# Patient Record
Sex: FEMALE | Employment: UNEMPLOYED | URBAN - METROPOLITAN AREA
[De-identification: names, ages, dates, MRNs, and addresses within clinical notes are randomized per-mention and may not be internally consistent; named-entity substitution may affect disease eponyms.]

---

## 2018-01-11 NOTE — MISCELLANEOUS
History of Present Illness  A call was made to the patient/patient's family  [facility] Additional Notes: [facility] was able to talk to the patient today  She has f/u OV with Dr Yasmani Small on 8/5  She stated she does not use salt but hasn't been weighing herself because she needs a battery for her scale  She is complaining of having difficulty sleeping at night because she has been used to using CPAP  Future Appointments    Date/Time Provider Specialty Site   09/15/2016 02:15 PM CINDI Perez  Pulmonary Medicine [facility] [place]   08/05/2016 08:00 AM CINDI Kaiser   Cardiology  CARDIOLOGY  [place]     Signatures   Electronically signed by : Vamshi Lanier, ; Jul 22 2016  1:14PM EST                       (Author)

## 2018-01-12 NOTE — MISCELLANEOUS
Message  Called patient and left messages to schedule a hospital follow up, patient has not return my call        Signatures   Electronically signed by : CINDI Caldwell ; Jul 13 2016 12:29PM EST

## 2018-01-12 NOTE — MISCELLANEOUS
Message  Called patient x3 to schedule post hospital apt  with Dr Freda Hyde   Electronically signed by : Eli Pineda, ; Aug 11 2016  2:53PM EST                       (Author)

## 2018-01-16 NOTE — MISCELLANEOUS
History of Present Illness    The patient is being contacted for follow-up after hospitalization  Left message on voicemail requesting return call  This was not a readmission  The date of discharge was 7/9/16  She has a high risk for readmission            Signatures   Electronically signed by : Lyndsay Jacobs, ; Jul 12 2016  3:07PM EST                       (Author)

## 2018-01-18 NOTE — MISCELLANEOUS
History of Present Illness  Communication  A message was left on voicemail requesting a return phone call  A call was made to the patient/patient's family  The contact name and phone number is   Patient had an OV with Dr Lazarus Pale scheduled for today and the HF program has a scale to give her but she did not show for her appointment  Call to patient but no answer, LM on  requesting return call to HF Program and encouraged her to call [facility] office and reschedule  Future Appointments    Date/Time Provider Specialty Site   09/15/2016 02:15 PM CINDI German   Pulmonary Medicine [facility] [place]     Signatures   Electronically signed by : Nanette Gallegos, ; Aug  5 2016  1:43PM EST                       (Author)

## 2018-08-18 ENCOUNTER — HOSPITAL ENCOUNTER (INPATIENT)
Age: 53
LOS: 1 days | Discharge: HOME | DRG: 293 | End: 2018-08-20
Attending: EMERGENCY MEDICINE | Admitting: HOSPITALIST

## 2018-08-18 ENCOUNTER — APPOINTMENT (OUTPATIENT)
Dept: GENERAL RADIOLOGY | Age: 53
DRG: 293 | End: 2018-08-18
Attending: EMERGENCY MEDICINE

## 2018-08-18 DIAGNOSIS — I50.9 ACUTE ON CHRONIC HEART FAILURE, UNSPECIFIED HEART FAILURE TYPE (HCC): Primary | ICD-10-CM

## 2018-08-18 DIAGNOSIS — F10.920 ALCOHOLIC INTOXICATION WITHOUT COMPLICATION (HCC): ICD-10-CM

## 2018-08-18 LAB
ANION GAP SERPL CALC-SCNC: 10 MMOL/L (ref 3–18)
BASOPHILS # BLD: 0 K/UL (ref 0–0.1)
BASOPHILS NFR BLD: 0 % (ref 0–2)
BNP SERPL-MCNC: 8545 PG/ML (ref 0–900)
BUN SERPL-MCNC: 21 MG/DL (ref 7–18)
BUN/CREAT SERPL: 22 (ref 12–20)
CALCIUM SERPL-MCNC: 9.3 MG/DL (ref 8.5–10.1)
CHLORIDE SERPL-SCNC: 104 MMOL/L (ref 100–108)
CO2 SERPL-SCNC: 29 MMOL/L (ref 21–32)
CREAT SERPL-MCNC: 0.96 MG/DL (ref 0.6–1.3)
DIFFERENTIAL METHOD BLD: ABNORMAL
EOSINOPHIL # BLD: 0.1 K/UL (ref 0–0.4)
EOSINOPHIL NFR BLD: 1 % (ref 0–5)
ERYTHROCYTE [DISTWIDTH] IN BLOOD BY AUTOMATED COUNT: 22.2 % (ref 11.6–14.5)
GLUCOSE SERPL-MCNC: 122 MG/DL (ref 74–99)
HCT VFR BLD AUTO: 44.5 % (ref 35–45)
HGB BLD-MCNC: 14 G/DL (ref 12–16)
LYMPHOCYTES # BLD: 3.6 K/UL (ref 0.9–3.6)
LYMPHOCYTES NFR BLD: 35 % (ref 21–52)
MAGNESIUM SERPL-MCNC: 2.1 MG/DL (ref 1.6–2.6)
MCH RBC QN AUTO: 30.4 PG (ref 24–34)
MCHC RBC AUTO-ENTMCNC: 31.5 G/DL (ref 31–37)
MCV RBC AUTO: 96.7 FL (ref 74–97)
MONOCYTES # BLD: 1.3 K/UL (ref 0.05–1.2)
MONOCYTES NFR BLD: 13 % (ref 3–10)
NEUTS SEG # BLD: 5.3 K/UL (ref 1.8–8)
NEUTS SEG NFR BLD: 51 % (ref 40–73)
PLATELET # BLD AUTO: 257 K/UL (ref 135–420)
PMV BLD AUTO: 10.4 FL (ref 9.2–11.8)
POTASSIUM SERPL-SCNC: 3.8 MMOL/L (ref 3.5–5.5)
RBC # BLD AUTO: 4.6 M/UL (ref 4.2–5.3)
SODIUM SERPL-SCNC: 143 MMOL/L (ref 136–145)
TROPONIN I SERPL-MCNC: 0.03 NG/ML (ref 0–0.04)
WBC # BLD AUTO: 10.2 K/UL (ref 4.6–13.2)

## 2018-08-18 PROCEDURE — 83880 ASSAY OF NATRIURETIC PEPTIDE: CPT | Performed by: EMERGENCY MEDICINE

## 2018-08-18 PROCEDURE — 96374 THER/PROPH/DIAG INJ IV PUSH: CPT

## 2018-08-18 PROCEDURE — 77030029684 HC NEB SM VOL KT MONA -A

## 2018-08-18 PROCEDURE — 83735 ASSAY OF MAGNESIUM: CPT | Performed by: EMERGENCY MEDICINE

## 2018-08-18 PROCEDURE — 80048 BASIC METABOLIC PNL TOTAL CA: CPT | Performed by: EMERGENCY MEDICINE

## 2018-08-18 PROCEDURE — 96375 TX/PRO/DX INJ NEW DRUG ADDON: CPT

## 2018-08-18 PROCEDURE — 74011000250 HC RX REV CODE- 250: Performed by: EMERGENCY MEDICINE

## 2018-08-18 PROCEDURE — 74011250636 HC RX REV CODE- 250/636: Performed by: EMERGENCY MEDICINE

## 2018-08-18 PROCEDURE — 99285 EMERGENCY DEPT VISIT HI MDM: CPT

## 2018-08-18 PROCEDURE — 71045 X-RAY EXAM CHEST 1 VIEW: CPT

## 2018-08-18 PROCEDURE — 96361 HYDRATE IV INFUSION ADD-ON: CPT

## 2018-08-18 PROCEDURE — 94640 AIRWAY INHALATION TREATMENT: CPT

## 2018-08-18 PROCEDURE — 84484 ASSAY OF TROPONIN QUANT: CPT | Performed by: EMERGENCY MEDICINE

## 2018-08-18 PROCEDURE — 85025 COMPLETE CBC W/AUTO DIFF WBC: CPT | Performed by: EMERGENCY MEDICINE

## 2018-08-18 PROCEDURE — 93005 ELECTROCARDIOGRAM TRACING: CPT

## 2018-08-18 RX ORDER — WARFARIN 1 MG/1
1 TABLET ORAL DAILY
COMMUNITY

## 2018-08-18 RX ORDER — FUROSEMIDE 40 MG/1
40 TABLET ORAL 2 TIMES DAILY
COMMUNITY

## 2018-08-18 RX ORDER — METOPROLOL SUCCINATE 50 MG/1
50 TABLET, EXTENDED RELEASE ORAL DAILY
COMMUNITY

## 2018-08-18 RX ORDER — FLUTICASONE FUROATE AND VILANTEROL 200; 25 UG/1; UG/1
1 POWDER RESPIRATORY (INHALATION) DAILY
COMMUNITY

## 2018-08-18 RX ORDER — PREDNISONE 10 MG/1
10 TABLET ORAL SEE ADMIN INSTRUCTIONS
COMMUNITY
End: 2018-08-20

## 2018-08-18 RX ORDER — POTASSIUM CHLORIDE 750 MG/1
10 TABLET, EXTENDED RELEASE ORAL DAILY
COMMUNITY

## 2018-08-18 RX ORDER — FUROSEMIDE 10 MG/ML
40 INJECTION INTRAMUSCULAR; INTRAVENOUS
Status: COMPLETED | OUTPATIENT
Start: 2018-08-18 | End: 2018-08-18

## 2018-08-18 RX ORDER — FAMOTIDINE 20 MG/1
20 TABLET, FILM COATED ORAL 2 TIMES DAILY
COMMUNITY

## 2018-08-18 RX ORDER — METFORMIN HYDROCHLORIDE 500 MG/1
500 TABLET ORAL 2 TIMES DAILY WITH MEALS
COMMUNITY

## 2018-08-18 RX ORDER — ATORVASTATIN CALCIUM 20 MG/1
20 TABLET, FILM COATED ORAL
COMMUNITY

## 2018-08-18 RX ORDER — IPRATROPIUM BROMIDE AND ALBUTEROL SULFATE 2.5; .5 MG/3ML; MG/3ML
3 SOLUTION RESPIRATORY (INHALATION)
Status: COMPLETED | OUTPATIENT
Start: 2018-08-18 | End: 2018-08-18

## 2018-08-18 RX ORDER — ALBUTEROL SULFATE 0.83 MG/ML
5 SOLUTION RESPIRATORY (INHALATION)
Status: COMPLETED | OUTPATIENT
Start: 2018-08-18 | End: 2018-08-18

## 2018-08-18 RX ORDER — ALBUTEROL SULFATE 90 UG/1
2 AEROSOL, METERED RESPIRATORY (INHALATION)
COMMUNITY

## 2018-08-18 RX ORDER — LISINOPRIL 5 MG/1
5 TABLET ORAL DAILY
COMMUNITY

## 2018-08-18 RX ORDER — ASPIRIN 81 MG/1
81 TABLET ORAL DAILY
COMMUNITY

## 2018-08-18 RX ORDER — ALLOPURINOL 100 MG/1
100 TABLET ORAL DAILY
COMMUNITY

## 2018-08-18 RX ADMIN — ALBUTEROL SULFATE 5 MG: 2.5 SOLUTION RESPIRATORY (INHALATION) at 19:06

## 2018-08-18 RX ADMIN — METHYLPREDNISOLONE SODIUM SUCCINATE 125 MG: 125 INJECTION, POWDER, FOR SOLUTION INTRAMUSCULAR; INTRAVENOUS at 19:03

## 2018-08-18 RX ADMIN — FUROSEMIDE 40 MG: 10 INJECTION, SOLUTION INTRAMUSCULAR; INTRAVENOUS at 20:51

## 2018-08-18 RX ADMIN — SODIUM CHLORIDE 1000 ML: 900 INJECTION, SOLUTION INTRAVENOUS at 19:01

## 2018-08-18 RX ADMIN — IPRATROPIUM BROMIDE AND ALBUTEROL SULFATE 3 ML: .5; 3 SOLUTION RESPIRATORY (INHALATION) at 19:06

## 2018-08-18 NOTE — ED TRIAGE NOTES
Patient presents via EMS for c/o chronic gout pain that is \"so bad that it is making her short of breath\". Patient able to speak in full sentences and able to move from EMS stretcher to ED stretcher NAD.

## 2018-08-18 NOTE — ED PROVIDER NOTES
EMERGENCY DEPARTMENT HISTORY AND PHYSICAL EXAM    6:07 PM      Date: 8/18/2018  Patient Name: Tati Bolanos    History of Presenting Illness     Chief Complaint   Patient presents with    Pain (Chronic)    Shortness of Breath         History Provided By: Patient    Chief Complaint: Bilateral knee pain  Duration:  Months  Timing:  Constant  Location: Bilateral knees  Quality:   Severity: Moderate  Modifying Factors: Movement  Associated Symptoms: Intermittent body aches and SOB      Additional History (Context): Tati Bolanos is a 48 y.o. female with a hx of COPD, CHF, and cirrhosis who presents with complaints of bilateral knee pain that she states she has had for a long time. She reports that the pain sometimes migrates into other areas of her body. She was recently seen at [facility] two days ago and was told that her sx were due to gout. She also reports some SOB. She has a hx of COPD and is not on O2 at baseline. She admits to drinking occasionally, she last drank earlier today. She was drinking an 8% alcohol beer cooler and was confused why here friends were drunk but she was not reportedly intoxicated. She states that she doesn't drink every day, but [place] She smokes but states she is cutting down. She has smoked for the past 38 years. Prior hx of crack use, but she has been clean since 1998. She denies fall, trauma, HA, abdominal pain, chest pain, fever, and any further complaints. PCP: None        Past History     Past Medical History:  Past Medical History:   Diagnosis Date    CHF [facility]congestive heart failure) ([facility] [address]     Cirrhosis (HCC)     COPD (chronic obstructive pulmonary disease) (HCC)     Gout        Past Surgical History:  No past surgical history on file. Family History:  No family history on file.     Social History:  Social History   Substance Use Topics    Smoking status: Not on file    Smokeless tobacco: Not on file  Alcohol use Not on file       Allergies:  No Known Allergies      Review of Systems     Review of Systems   Constitutional: Negative for fever. Respiratory: Positive for shortness of breath. Gastrointestinal: Negative for abdominal pain, diarrhea, nausea and vomiting. Musculoskeletal: Positive for arthralgias (bilateral knees) and myalgias. Neurological: Negative for numbness and headaches. All other systems reviewed and are negative. Visit Vitals    /56    Pulse (!) 115    Temp 98 °F (36.7 °C)    Resp (!) 33    Ht 5' 5\" (1.651 m)    Wt 88.5 kg (195 lb)    SpO2 97%    BMI 32.45 kg/m2           Physical Exam / Medical Decision Making   I am the first provider for this patient. I reviewed the vital signs, available nursing notes, past medical history, past surgical history, family history and social history. Vital Signs-Reviewed the patient's vital signs. Physical exam:  General:  Well-developed, well-nourished, mildly slurred speech. Head:  Normocephalic atraumatic. Eyes:  Pupils midrange extraocular movements intact. No pallor or conjunctival injection. Nose:  No rhinorrhea, inspection grossly normal.    Ears:  Grossly normal to inspection, no discharge. Mouth:  Mucous membranes dry , no appreciable intraoral lesion. Neck/Back:  Trachea midline, no asymmetry. Chest:  Grossly normal inspection, symmetric chest rise. Pulmonary:  Clear to auscultation bilaterally no wheezes rhonchi or rales. Cardiovascular:  S1-S2 no murmurs rubs or gallops. Abdomen: Soft, nontender, minimally distended no guarding rebound or peritoneal signs. Extremities:  Grossly normal to inspection, peripheral pulses intact     Neurologic:  Alert and oriented no appreciable focal neurologic deficit    Skin:  Warm and dry  Psychiatric:  Grossly normal mood and affect  Nursing note reviewed, vital signs reviewed.     ED course:  Patient presents with initial complaint of pain all over, no recent trauma pain is in the RIGHT leg and RIGHT arm the RIGHT trapezius, very sleepy and reports that she has been [place] the beach all day. She has a history of CAD smokes cigarettes and drinks alcohol. 2nd complaint is of her shortness of breath, history of COPD and chronically since PCP exacerbation she is tachycardic but dehydrated reports drinking on the beach all day and clinically intoxicated. She is tachycardic, clinical history suggests that she is dehydrated as she was sitting on a beach drinking wine coolers all day. We'll bolus here and reevaluate does have a history of CHF by her report \"the LEFT side of her heart is okay\"    We'll hydrate and monitor    Monitor sinus tachycardia    EKG done at 1942 hrs. sinus tachycardia heart rate 116 intervals within normal limits no ST changes, seems to have a long QTc visually computer interpretation is 480. Chest x-ray per my interpretation cardiomegaly and pulmonary edema    BNP elevated consistent with CHF    Patient reevaluated her wheezing has cleared, her lung sounds are clear however she is still tachypneic her tachycardia is improved, will diurese here and reevaluate    She just moved here has no local cardiologist.  Merly  reassess after diuresis    Patient was diuresed, multiple episodes of urine, remains tachycardic and tachypneic    We'll admit for further management      Patient's presentation, history, physical exam and laboratory evaluations were reviewed. I felt the patient would benefit from inpatient management and treatment. Consult:  Discussed care with Dr. Dillon Russo. Standard discussion; including history of patients chief complaint, available diagnostic results, and treatment course. Patient was accepted to their service.           Disposition:    Admitted to medicine service      Portions of this chart were created with Dragon medical speech to text program.   Unrecognized errors may be present. Diagnostic Study Results     Labs -  Recent Results (from the past 12 hour(s))   CBC WITH AUTOMATED DIFF    Collection Time: 08/18/18  5:50 PM   Result Value Ref Range    WBC 10.2 4.6 - 13.2 K/uL    RBC 4.60 4.20 - 5.30 M/uL    HGB 14.0 12.0 - 16.0 g/dL    HCT 44.5 35.0 - 45.0 %    MCV 96.7 74.0 - 97.0 FL    MCH 30.4 24.0 - 34.0 PG    MCHC 31.5 31.0 - 37.0 g/dL    RDW 22.2 (H) 11.6 - 14.5 %    PLATELET 981 114 - 482 K/uL    MPV 10.4 9.2 - 11.8 FL    NEUTROPHILS 51 40 - 73 %    LYMPHOCYTES 35 21 - 52 %    MONOCYTES 13 (H) 3 - 10 %    EOSINOPHILS 1 0 - 5 %    BASOPHILS 0 0 - 2 %    ABS. NEUTROPHILS 5.3 1.8 - 8.0 K/UL    ABS. LYMPHOCYTES 3.6 0.9 - 3.6 K/UL    ABS. MONOCYTES 1.3 (H) 0.05 - 1.2 K/UL    ABS. EOSINOPHILS 0.1 0.0 - 0.4 K/UL    ABS.  BASOPHILS 0.0 0.0 - 0.1 K/UL    DF AUTOMATED     METABOLIC PANEL, BASIC    Collection Time: 08/18/18  5:50 PM   Result Value Ref Range    Sodium 143 136 - 145 mmol/L    Potassium 3.8 3.5 - 5.5 mmol/L    Chloride 104 100 - 108 mmol/L    CO2 29 21 - 32 mmol/L    Anion gap 10 3.0 - 18 mmol/L    Glucose 122 (H) 74 - 99 mg/dL    BUN 21 (H) 7.0 - 18 MG/DL    Creatinine 0.96 0.6 - 1.3 MG/DL    BUN/Creatinine ratio 22 (H) 12 - 20      GFR est AA >60 >60 ml/min/1.73m2    GFR est non-AA >60 >60 ml/min/1.73m2    Calcium 9.3 8.5 - 10.1 MG/DL   MAGNESIUM    Collection Time: 08/18/18  5:50 PM   Result Value Ref Range    Magnesium 2.1 1.6 - 2.6 mg/dL   TROPONIN I    Collection Time: 08/18/18  5:50 PM   Result Value Ref Range    Troponin-I, Qt. 0.03 0.0 - 0.045 NG/ML   NT-PRO BNP    Collection Time: 08/18/18  5:50 PM   Result Value Ref Range    NT pro-BNP 8545 (H) 0 - 900 PG/ML   EKG, 12 LEAD, INITIAL    Collection Time: 08/18/18  7:42 PM   Result Value Ref Range    Ventricular Rate 116 BPM    Atrial Rate 116 BPM    P-R Interval 150 ms    QRS Duration 72 ms    Q-T Interval 346 ms    QTC Calculation (Bezet) 480 ms    Calculated P Axis 64 degrees    Calculated R Axis 108 degrees Calculated T Axis 38 degrees    Diagnosis       Sinus tachycardia  Biatrial enlargement  Rightward axis  Pulmonary disease pattern  Abnormal ECG  No previous ECGs available         Radiologic Studies -   XR CHEST PORT    (Results Pending)       Diagnosis     Clinical Impression: No diagnosis found. Follow-up Information     None           Current Discharge Medication List      CONTINUE these medications which have NOT CHANGED    Details   famotidine (PEPCID) 20 mg tablet Take 20 mg by mouth two (2) times a day. metoprolol succinate (TOPROL-XL) 50 mg XL tablet Take 50 mg by mouth daily. predniSONE (STERAPRED DS) 10 mg dose pack Take 10 mg by mouth See Admin Instructions. See administration instruction per 10mg dose pack      atorvastatin (LIPITOR) 20 mg tablet Take 20 mg by mouth nightly. lisinopril (PRINIVIL, ZESTRIL) 5 mg tablet Take 5 mg by mouth daily. allopurinol (ZYLOPRIM) 100 mg tablet Take 100 mg by mouth daily. potassium chloride (KLOR-CON M10) 10 mEq tablet Take 10 mEq by mouth daily. furosemide (LASIX) 40 mg tablet Take 40 mg by mouth two (2) times a day. warfarin (COUMADIN) 1 mg tablet Take 1 mg by mouth daily. 2 tablets daily or as directed by clinic      metFORMIN (GLUCOPHAGE) 500 mg tablet Take 500 mg by mouth two (2) times daily (with meals). aspirin delayed-release 81 mg tablet Take 81 mg by mouth daily. umeclidinium (INCRUSE ELLIPTA) 62.5 mcg/actuation inhaler Take 1 Puff by inhalation daily. fluticasone-vilanterol (BREO ELLIPTA) 200-25 mcg/dose inhaler Take 1 Puff by inhalation daily.       albuterol (VENTOLIN HFA) 90 mcg/actuation inhaler Take 2 Puffs by inhalation every four (4) hours as needed for Wheezing.           _______________________________    Attestations:  Randell [address] [place] [address] acting as a scribe for and in the presence of Mendoza Álvarez MD      August 19, 2018 at 1:45 AM       Provider Attestation:      I personally performed the services described in the documentation, reviewed the documentation, as recorded by the scribe in my presence, and it accurately and completely records my words and actions.  August 19, 2018 at 1:45 AM - Mark Osorio MD    _______________________________

## 2018-08-18 NOTE — IP AVS SNAPSHOT
Daniela Bowens 
 
 
 [address] [place] [address] 
[phone] Patient: Cas Mata MRN: [id number] :1965 A check rona indicates which time of day the medication should be taken. My Medications CONTINUE taking these medications Instructions Each Dose to Equal  
 Morning Noon Evening Bedtime  
 allopurinol 100 mg tablet Commonly known as:  Mattie Huh Your last dose was: Your next dose is: Take 100 mg by mouth daily. 100 mg  
    
   
   
   
  
 aspirin delayed-release 81 mg tablet Your last dose was: Your next dose is: Take 81 mg by mouth daily. 81 mg  
    
   
   
   
  
 atorvastatin 20 mg tablet Commonly known as:  LIPITOR Your last dose was: Your next dose is: Take 20 mg by mouth nightly. 20 mg  
    
   
   
   
  
 BREO ELLIPTA 200-25 mcg/dose inhaler Generic drug:  fluticasone-vilanterol Your last dose was: Your next dose is: Take 1 Puff by inhalation daily. 1 Puff  
    
   
   
   
  
 famotidine 20 mg tablet Commonly known as:  PEPCID Your last dose was: Your next dose is: Take 20 mg by mouth two (2) times a day. 20 mg  
    
   
   
   
  
 furosemide 40 mg tablet Commonly known as:  LASIX Your last dose was: Your next dose is: Take 40 mg by mouth two (2) times a day. 40 mg  
    
   
   
   
  
 INCRUSE ELLIPTA 62.5 mcg/actuation inhaler Generic drug:  umeclidinium Your last dose was: Your next dose is: Take 1 Puff by inhalation daily. 1 Puff KLOR-CON M10 10 mEq tablet Generic drug:  potassium chloride Your last dose was: Your next dose is: Take 10 mEq by mouth daily. 10 mEq  
    
   
   
   
  
 lisinopril 5 mg tablet Commonly known as:  Charis Osman  
 Your last dose was: Your next dose is: Take 5 mg by mouth daily. 5 mg  
    
   
   
   
  
 metFORMIN 500 mg tablet Commonly known as:  GLUCOPHAGE Your last dose was: Your next dose is: Take 500 mg by mouth two (2) times daily (with meals). 500 mg  
    
   
   
   
  
 metoprolol succinate 50 mg XL tablet Commonly known as:  TOPROL-XL Your last dose was: Your next dose is: Take 50 mg by mouth daily. 50 mg VENTOLIN HFA 90 mcg/actuation inhaler Generic drug:  albuterol Your last dose was: Your next dose is: Take 2 Puffs by inhalation every four (4) hours as needed for Wheezing. 2 Puff  
    
   
   
   
  
 warfarin 1 mg tablet Commonly known as:  COUMADIN Your last dose was: Your next dose is: Take 1 mg by mouth daily. 2 tablets daily or as directed by clinic  
 1 mg STOP taking these medications   
 predniSONE 10 mg dose pack Commonly known as:  STERAPRED DS

## 2018-08-18 NOTE — IP AVS SNAPSHOT
[address] [address] 
[phone] Patient: Magalie Blum MRN: [id number] :1965 About your hospitalization You were admitted on:  2018 You last received care in the:  [address] [place] [address] You were discharged on:  2018 Why you were hospitalized Your primary diagnosis was:  Acute On Chronic Heart Failure (Hcc) Your diagnoses also included:  Gout, Copd (Chronic Obstructive Pulmonary Disease) (Hcc), Dm (Diabetes Mellitus) (Hcc), Hld (Hyperlipidemia), Acute On Chronic Congestive Heart Failure (Hcc), Aron (Obstructive Sleep Apnea), Alcohol Intoxication (Hcc) Follow-up Information Follow up With Details Comments Contact Info Jorge Luis Nichols NP In 1 week follow up with PCP within 1 week to discuss hospitalization [id number] [id number] 
[phone] Discharge Orders None A check rona indicates which time of day the medication should be taken. My Medications CONTINUE taking these medications Instructions Each Dose to Equal  
 Morning Noon Evening Bedtime  
 allopurinol 100 mg tablet Commonly known as:  Ollen Epley Your last dose was: Your next dose is: Take 100 mg by mouth daily. 100 mg  
    
   
   
   
  
 aspirin delayed-release 81 mg tablet Your last dose was: Your next dose is: Take 81 mg by mouth daily. 81 mg  
    
   
   
   
  
 atorvastatin 20 mg tablet Commonly known as:  LIPITOR Your last dose was: Your next dose is: Take 20 mg by mouth nightly. 20 mg  
    
   
   
   
  
 BREO ELLIPTA 200-25 mcg/dose inhaler Generic drug:  fluticasone-vilanterol Your last dose was: Your next dose is: Take 1 Puff by inhalation daily. 1 Puff  
    
   
   
   
  
 famotidine 20 mg tablet Commonly known as:  PEPCID  
 Your last dose was: Your next dose is: Take 20 mg by mouth two (2) times a day. 20 mg  
    
   
   
   
  
 furosemide 40 mg tablet Commonly known as:  LASIX Your last dose was: Your next dose is: Take 40 mg by mouth two (2) times a day. 40 mg  
    
   
   
   
  
 INCRUSE ELLIPTA 62.5 mcg/actuation inhaler Generic drug:  umeclidinium Your last dose was: Your next dose is: Take 1 Puff by inhalation daily. 1 Puff KLOR-CON M10 10 mEq tablet Generic drug:  potassium chloride Your last dose was: Your next dose is: Take 10 mEq by mouth daily. 10 mEq  
    
   
   
   
  
 lisinopril 5 mg tablet Commonly known as:  Alonso Economy Your last dose was: Your next dose is: Take 5 mg by mouth daily. 5 mg  
    
   
   
   
  
 metFORMIN 500 mg tablet Commonly known as:  GLUCOPHAGE Your last dose was: Your next dose is: Take 500 mg by mouth two (2) times daily (with meals). 500 mg  
    
   
   
   
  
 metoprolol succinate 50 mg XL tablet Commonly known as:  TOPROL-XL Your last dose was: Your next dose is: Take 50 mg by mouth daily. 50 mg VENTOLIN HFA 90 mcg/actuation inhaler Generic drug:  albuterol Your last dose was: Your next dose is: Take 2 Puffs by inhalation every four (4) hours as needed for Wheezing. 2 Puff  
    
   
   
   
  
 warfarin 1 mg tablet Commonly known as:  COUMADIN Your last dose was: Your next dose is: Take 1 mg by mouth daily. 2 tablets daily or as directed by clinic  
 1 mg STOP taking these medications   
 predniSONE 10 mg dose pack Commonly known as:  STERAPRED DS Discharge Instructions DISCHARGE SUMMARY from Nurse PATIENT INSTRUCTIONS: 
 
 
F-face looks uneven A-arms unable to move or move unevenly S-speech slurred or non-existent T-time-call 911 as soon as signs and symptoms begin-DO NOT go Back to bed or wait to see if you get better-TIME IS BRAIN. Warning Signs of HEART ATTACK Call 911 if you have these symptoms: 
? Chest discomfort. Most heart attacks involve discomfort in the center of the chest that lasts more than a few minutes, or that goes away and comes back. It can feel like uncomfortable pressure, squeezing, fullness, or pain. ? Discomfort in other areas of the upper body. Symptoms can include pain or discomfort in one or both arms, the back, neck, jaw, or stomach. ? Shortness of breath with or without chest discomfort. ? Other signs may include breaking out in a cold sweat, nausea, or lightheadedness. Don't wait more than five minutes to call [address]! Fast action can save your life. Calling 911 is almost always the fastest way to get lifesaving treatment. Emergency Medical Services staff can begin treatment when they arrive  up to an hour sooner than if someone gets to the hospital by car. The discharge information has been reviewed with the patient. The patient verbalized understanding. Discharge medications reviewed with the patient and appropriate educational materials and side effects teaching were provided. ___________________________________________________________________________________________________________________________________ Heart Failure: Care Instructions Your Care Instructions Heart failure occurs when your heart does not pump as much blood as the body needs. Failure does not mean that the heart has stopped pumping but rather that it is not pumping as well as it should. Over time, this causes fluid buildup in your lungs and other parts of your body. Fluid buildup can cause shortness of breath, fatigue, swollen ankles, and other problems. By taking medicines regularly, reducing sodium (salt) in your diet, checking your weight every day, and making lifestyle changes, you can feel better and live longer. Follow-up care is a key part of your treatment and safety. Be sure to make and go to all appointments, and call your doctor if you are having problems. It's also a good idea to know your test results and keep a list of the medicines you take. How can you care for yourself at home? Medicines 
  · Be safe with medicines. Take your medicines exactly as prescribed. Call your doctor if you think you are having a problem with your medicine.  
  · Do not take any vitamins, over-the-counter medicine, or herbal products without talking to your doctor first. Leonidas Irons not take ibuprofen (Advil or Motrin) and naproxen (Aleve) without talking to your doctor first. They could make your heart failure worse.  
  · You may be taking some of the following medicine. ¨ Beta-blockers can slow heart rate, decrease blood pressure, and improve your condition. Taking a beta-blocker may lower your chance of needing to be hospitalized. ¨ Angiotensin-converting enzyme inhibitors (ACEIs) reduce the heart's workload, lower blood pressure, and reduce swelling. Taking an ACEI may lower your chance of needing to be hospitalized again. ¨ Angiotensin II receptor blockers (ARBs) work like ACEIs. Your doctor may prescribe them instead of ACEIs. ¨ Diuretics, also called water pills, reduce swelling. ¨ Potassium supplements replace this important mineral, which is sometimes lost with diuretics. ¨ Aspirin and other blood thinners prevent blood clots, which can cause a stroke or heart attack.  You will get more details on the specific medicines your doctor prescribes. Diet 
  · Your doctor may suggest that you limit sodium to 2,000 milligrams (mg) a day or less. That is less than 1 teaspoon of salt a day, including all the salt you eat in cooking or in packaged foods. People get most of their sodium from processed foods. Fast food and restaurant meals also tend to be very high in sodium.  
  · Ask your doctor how much liquid you can drink each day. You may have to limit liquids.  
 Weight 
  · Weigh yourself without clothing at the same time each day. Record your weight. Call your doctor if you have a sudden weight gain, such as more than 2 to 3 pounds in a day or 5 pounds in a week. (Your doctor may suggest a different range of weight gain.) A sudden weight gain may mean that your heart failure is getting worse.  
 Activity level 
  · Start light exercise (if your doctor says it is okay). Even if you can only do a small amount, exercise will help you get stronger, have more energy, and manage your weight and your stress. Walking is an easy way to get exercise. Start out by walking a little more than you did before. Bit by bit, increase the amount you walk.  
  · When you exercise, watch for signs that your heart is working too hard. You are pushing yourself too hard if you cannot talk while you are exercising. If you become short of breath or dizzy or have chest pain, stop, sit down, and rest.  
  · If you feel \"wiped out\" the day after you exercise, walk slower or for a shorter distance until you can work up to a better pace.  
  · Get enough rest at night. Sleeping with 1 or 2 pillows under your upper body and head may help you breathe easier.  
 Lifestyle changes 
  · Do not smoke. Smoking can make a heart condition worse. If you need help quitting, talk to your doctor about stop-smoking programs and medicines. These can increase your chances of quitting for good.  Quitting smoking may be the most important step you can take to protect your heart.  
  · Limit alcohol to 2 drinks a day for men and 1 drink a day for women. Too much alcohol can cause health problems.  
  · Avoid getting sick from colds and the flu. Get a pneumococcal vaccine shot. If you have had one before, ask your doctor whether you need another dose. Get a flu shot each year. If you must be around people with colds or the flu, wash your hands often. When should you call for help? Call 911 if you have symptoms of sudden heart failure such as: 
  · You have severe trouble breathing.  
  · You cough up pink, foamy mucus.  
  · You have a new irregular or rapid heartbeat.  
 Call your doctor now or seek immediate medical care if: 
  · You have new or increased shortness of breath.  
  · You are dizzy or lightheaded, or you feel like you may faint.  
  · You have sudden weight gain, such as more than 2 to 3 pounds in a day or 5 pounds in a week. (Your doctor may suggest a different range of weight gain.)  
  · You have increased swelling in your legs, ankles, or feet.  
  · You are suddenly so tired or weak that you cannot do your usual activities.  
 Watch closely for changes in your health, and be sure to contact your doctor if you develop new symptoms. Where can you learn more? Go to http://dinesh-cecil.info/. Enter X430 in the search box to learn more about \"Heart Failure: Care Instructions. \" Current as of: May 10, 2017 Content Version: 11.7 © 4546-3600 Healthwise, Incorporated. Care instructions adapted under license by [employer] (which disclaims liability or warranty for this information). If you have questions about a medical condition or this instruction, always ask your healthcare professional. [address] [address] any warranty or liability for your use of this information. Patient armband removed and shredded MyChart Announcement We are excited to announce that we are making your provider's discharge notes available to you in Domino. You will see these notes when they are completed and signed by the physician that discharged you from your recent hospital stay. If you have any questions or concerns about any information you see in Domino, please call the Health Information Department where you were seen or reach out to your Primary Care Provider for more information about your plan of care. Introducing [address]! [facility] introduces Domino patient portal. Now you can access parts of your medical record, email your doctor's office, and request medication refills online. 1. In your internet browser, go to [email or web address] 2. Click on the First Time User? Click Here link in the Sign In box. You will see the New Member Sign Up page. 3. Enter your Domino Access Code exactly as it appears below. You will not need to use this code after youve completed the sign-up process. If you do not sign up before the expiration date, you must request a new code. · Domino Access Code: [email or web address] Expires: 11/16/2018  5:53 PM 
 
4. Enter the last four digits of your Social Security Number (xxxx) and Date of Birth (mm/dd/yyyy) as indicated and click Submit. You will be taken to the next sign-up page. 5. Create a Domino ID. This will be your Domino login ID and cannot be changed, so think of one that is secure and easy to remember. 6. Create a Domino password. You can change your password at any time. 7. Enter your Password Reset Question and Answer. This can be used at a later time if you forget your password. 8. Enter your e-mail address. You will receive e-mail notification when new information is available in [address] 9. Click Sign Up. You can now view and download portions of your medical record.  
10. Click the Download Summary menu link to download a portable copy of your medical information. If you have questions, please visit the Frequently Asked Questions section of the [employer] website. Remember, NovoED is NOT to be used for urgent needs. For medical emergencies, dial 911. Now available from your iPhone and Android! Introducing Niles Vora As a [facility] [employer] [facility] patient, I wanted to make you aware of our electronic visit tool called Niles Vora. [email or web address] allows you to connect within minutes with a medical provider 24 hours a day, seven days a week via a mobile device or tablet or logging into a secure website from your computer. You can access Niles Vora from anywhere in the [place]. A virtual visit might be right for you when you have a simple condition and feel like you just dont want to get out of bed, or cant get away from work for an appointment, when your regular [facility] provider is not available (evenings, weekends or holidays), or when youre out of town and need minor care. Electronic visits cost only $49 and if the [employer] provider determines a prescription is needed to treat your condition, one can be electronically transmitted to a nearby pharmacy*. Please take a moment to enroll today if you have not already done so. The enrollment process is free and takes just a few minutes. To enroll, please download the Kimeltu pipo to your tablet or phone, or visit [email or web address] to enroll on your computer. And, as an [address] [place] [address] patient with a Ripl account, the results of your visits will be scanned into your electronic medical record and your primary care provider will be able to view the scanned results. We urge you to continue to see your regular [facility] provider for your ongoing medical care.   And while your primary care provider may not be the one available when you seek a Niles Vora virtual visit, the peace of mind you get from getting a real diagnosis real time can be priceless. For more information on Niles Vora, view our Frequently Asked Questions (FAQs) at [email or web address]. Sincerely, 
 
Zuleima Park MD 
Chief Medical Officer [employer] *:  certain medications cannot be prescribed via Niles Vora Providers Seen During Your Hospitalization Provider Specialty Primary office phone Cecilia Barksdale, DO Emergency Medicine [phone] Yumiko Cohen MD Emergency Medicine [phone] Brice Meigs, MD Internal Medicine [phone] Lalitha Montelongo, DO Internal Medicine [phone] Your Primary Care Physician (PCP) Primary Care Physician Office Phone Office Fax Lexy Dumont [phone] You are allergic to the following No active allergies Recent Documentation Height Weight Breastfeeding? BMI Smoking Status 1.549 m 96.2 kg No 40.06 kg/m2 Never Assessed Emergency Contacts Name Discharge Info Relation Home Work Mobile None,None N/A  AT THIS TIME [6] Patient Belongings The following personal items are in your possession at time of discharge: 
  Dental Appliances: None  Visual Aid: Glasses, With patient      Home Medications: Sent to pharmacy   Jewelry: Body Piercing, Earrings, Ring, Watch, With patient  Clothing: At bedside, Dress, Footwear, Shirt, Shorts, Undergarments    Other Valuables: At bedside, Cell Phone, Eyeglasses, Money (comment), Walker, Other (comment) (CPAP machine)  Personal Items Sent to Safe: none. refused. Please provide this summary of care documentation to your next provider. Signatures-by signing, you are acknowledging that this After Visit Summary has been reviewed with you and you have received a copy. Patient Signature:  ____________________________________________________________ Date:  ____________________________________________________________  
  
Kika Jayton Provider Signature:  ____________________________________________________________ Date:  ____________________________________________________________

## 2018-08-19 ENCOUNTER — APPOINTMENT (OUTPATIENT)
Dept: NON INVASIVE DIAGNOSTICS | Age: 53
DRG: 293 | End: 2018-08-19
Attending: HOSPITALIST

## 2018-08-19 PROBLEM — E11.9 DM (DIABETES MELLITUS) (HCC): Status: ACTIVE | Noted: 2018-08-19

## 2018-08-19 PROBLEM — I50.9 ACUTE ON CHRONIC CONGESTIVE HEART FAILURE (HCC): Status: ACTIVE | Noted: 2018-08-19

## 2018-08-19 PROBLEM — J96.01 ACUTE RESPIRATORY FAILURE WITH HYPOXIA (HCC): Status: ACTIVE | Noted: 2018-08-19

## 2018-08-19 PROBLEM — J44.9 COPD (CHRONIC OBSTRUCTIVE PULMONARY DISEASE) (HCC): Status: ACTIVE | Noted: 2018-08-19

## 2018-08-19 PROBLEM — G47.33 OSA (OBSTRUCTIVE SLEEP APNEA): Status: ACTIVE | Noted: 2018-08-19

## 2018-08-19 PROBLEM — F10.929 ALCOHOL INTOXICATION (HCC): Status: ACTIVE | Noted: 2018-08-19

## 2018-08-19 PROBLEM — I50.9 ACUTE ON CHRONIC HEART FAILURE (HCC): Status: ACTIVE | Noted: 2018-08-19

## 2018-08-19 PROBLEM — E78.5 HLD (HYPERLIPIDEMIA): Status: ACTIVE | Noted: 2018-08-19

## 2018-08-19 PROBLEM — M10.9 GOUT: Status: ACTIVE | Noted: 2018-08-19

## 2018-08-19 LAB
ATRIAL RATE: 116 BPM
CALCULATED P AXIS, ECG09: 64 DEGREES
CALCULATED R AXIS, ECG10: 108 DEGREES
CALCULATED T AXIS, ECG11: 38 DEGREES
DIAGNOSIS, 93000: NORMAL
ECHO AO ASC DIAM: 2.92 CM
ECHO AO ROOT DIAM: 3.12 CM
ECHO LV INTERNAL DIMENSION DIASTOLIC: 3.91 CM (ref 3.9–5.3)
ECHO LV INTERNAL DIMENSION SYSTOLIC: 2.9 CM
ECHO LV IVSD: 1.06 CM (ref 0.6–0.9)
ECHO LV MASS 2D: 152.9 G (ref 67–162)
ECHO LV MASS INDEX 2D: 79.8 G/M2
ECHO LV POSTERIOR WALL DIASTOLIC: 1.07 CM (ref 0.6–0.9)
ECHO MV A VELOCITY: 101.33 CM/S
ECHO MV E VELOCITY: 0.7 CM/S
ECHO MV E/A RATIO: 0.01
ECHO TV REGURGITANT MAX VELOCITY: -202.99 CM/S
ECHO TV REGURGITANT PEAK GRADIENT: 16.5 MMHG
EST. AVERAGE GLUCOSE BLD GHB EST-MCNC: 148 MG/DL
GLUCOSE BLD STRIP.AUTO-MCNC: 110 MG/DL (ref 70–110)
GLUCOSE BLD STRIP.AUTO-MCNC: 119 MG/DL (ref 70–110)
GLUCOSE BLD STRIP.AUTO-MCNC: 145 MG/DL (ref 70–110)
GLUCOSE BLD STRIP.AUTO-MCNC: 191 MG/DL (ref 70–110)
HBA1C MFR BLD: 6.8 % (ref 4.2–5.6)
INR PPP: 2.9 (ref 0.8–1.2)
MAGNESIUM SERPL-MCNC: 1.9 MG/DL (ref 1.6–2.6)
P-R INTERVAL, ECG05: 150 MS
PROTHROMBIN TIME: 30.8 SEC (ref 11.5–15.2)
Q-T INTERVAL, ECG07: 346 MS
QRS DURATION, ECG06: 72 MS
QTC CALCULATION (BEZET), ECG08: 480 MS
TROPONIN I SERPL-MCNC: 0.02 NG/ML (ref 0–0.04)
TROPONIN I SERPL-MCNC: 0.02 NG/ML (ref 0–0.04)
TROPONIN I SERPL-MCNC: <0.02 NG/ML (ref 0–0.04)
VENTRICULAR RATE, ECG03: 116 BPM

## 2018-08-19 PROCEDURE — 36415 COLL VENOUS BLD VENIPUNCTURE: CPT | Performed by: HOSPITALIST

## 2018-08-19 PROCEDURE — 65660000000 HC RM CCU STEPDOWN

## 2018-08-19 PROCEDURE — 84484 ASSAY OF TROPONIN QUANT: CPT | Performed by: HOSPITALIST

## 2018-08-19 PROCEDURE — 74011636637 HC RX REV CODE- 636/637: Performed by: HOSPITALIST

## 2018-08-19 PROCEDURE — 82962 GLUCOSE BLOOD TEST: CPT

## 2018-08-19 PROCEDURE — 93306 TTE W/DOPPLER COMPLETE: CPT

## 2018-08-19 PROCEDURE — 77010033678 HC OXYGEN DAILY

## 2018-08-19 PROCEDURE — 83036 HEMOGLOBIN GLYCOSYLATED A1C: CPT | Performed by: HOSPITALIST

## 2018-08-19 PROCEDURE — 83735 ASSAY OF MAGNESIUM: CPT | Performed by: HOSPITALIST

## 2018-08-19 PROCEDURE — 85610 PROTHROMBIN TIME: CPT | Performed by: HOSPITALIST

## 2018-08-19 PROCEDURE — 97116 GAIT TRAINING THERAPY: CPT

## 2018-08-19 PROCEDURE — 74011250636 HC RX REV CODE- 250/636: Performed by: HOSPITALIST

## 2018-08-19 PROCEDURE — 74011250637 HC RX REV CODE- 250/637: Performed by: HOSPITALIST

## 2018-08-19 PROCEDURE — 97162 PT EVAL MOD COMPLEX 30 MIN: CPT

## 2018-08-19 RX ORDER — ONDANSETRON 2 MG/ML
4 INJECTION INTRAMUSCULAR; INTRAVENOUS
Status: DISCONTINUED | OUTPATIENT
Start: 2018-08-19 | End: 2018-08-20 | Stop reason: HOSPADM

## 2018-08-19 RX ORDER — INSULIN LISPRO 100 [IU]/ML
INJECTION, SOLUTION INTRAVENOUS; SUBCUTANEOUS
Status: DISCONTINUED | OUTPATIENT
Start: 2018-08-19 | End: 2018-08-20 | Stop reason: HOSPADM

## 2018-08-19 RX ORDER — WARFARIN 1 MG/1
1 TABLET ORAL DAILY
Status: DISCONTINUED | OUTPATIENT
Start: 2018-08-19 | End: 2018-08-19

## 2018-08-19 RX ORDER — ATORVASTATIN CALCIUM 20 MG/1
20 TABLET, FILM COATED ORAL
Status: DISCONTINUED | OUTPATIENT
Start: 2018-08-19 | End: 2018-08-20 | Stop reason: HOSPADM

## 2018-08-19 RX ORDER — WARFARIN 1 MG/1
1 TABLET ORAL EVERY EVENING
Status: DISCONTINUED | OUTPATIENT
Start: 2018-08-20 | End: 2018-08-20 | Stop reason: HOSPADM

## 2018-08-19 RX ORDER — FLUTICASONE FUROATE AND VILANTEROL 200; 25 UG/1; UG/1
1 POWDER RESPIRATORY (INHALATION)
Status: DISCONTINUED | OUTPATIENT
Start: 2018-08-19 | End: 2018-08-20 | Stop reason: HOSPADM

## 2018-08-19 RX ORDER — FUROSEMIDE 10 MG/ML
60 INJECTION INTRAMUSCULAR; INTRAVENOUS EVERY 12 HOURS
Status: DISCONTINUED | OUTPATIENT
Start: 2018-08-19 | End: 2018-08-20 | Stop reason: HOSPADM

## 2018-08-19 RX ORDER — MAGNESIUM SULFATE 100 %
4 CRYSTALS MISCELLANEOUS AS NEEDED
Status: DISCONTINUED | OUTPATIENT
Start: 2018-08-19 | End: 2018-08-20 | Stop reason: HOSPADM

## 2018-08-19 RX ORDER — ACETAMINOPHEN 325 MG/1
650 TABLET ORAL
Status: DISCONTINUED | OUTPATIENT
Start: 2018-08-19 | End: 2018-08-20 | Stop reason: HOSPADM

## 2018-08-19 RX ORDER — ALLOPURINOL 100 MG/1
100 TABLET ORAL DAILY
Status: DISCONTINUED | OUTPATIENT
Start: 2018-08-19 | End: 2018-08-20 | Stop reason: HOSPADM

## 2018-08-19 RX ORDER — METOPROLOL SUCCINATE 50 MG/1
50 TABLET, EXTENDED RELEASE ORAL DAILY
Status: DISCONTINUED | OUTPATIENT
Start: 2018-08-19 | End: 2018-08-20 | Stop reason: HOSPADM

## 2018-08-19 RX ORDER — FAMOTIDINE 20 MG/1
20 TABLET, FILM COATED ORAL 2 TIMES DAILY
Status: DISCONTINUED | OUTPATIENT
Start: 2018-08-19 | End: 2018-08-20 | Stop reason: HOSPADM

## 2018-08-19 RX ORDER — LISINOPRIL 5 MG/1
5 TABLET ORAL DAILY
Status: DISCONTINUED | OUTPATIENT
Start: 2018-08-19 | End: 2018-08-20 | Stop reason: HOSPADM

## 2018-08-19 RX ORDER — NYSTATIN 100000 [USP'U]/G
POWDER TOPICAL 2 TIMES DAILY
Status: DISCONTINUED | OUTPATIENT
Start: 2018-08-19 | End: 2018-08-20 | Stop reason: HOSPADM

## 2018-08-19 RX ORDER — DEXTROSE 50 % IN WATER (D50W) INTRAVENOUS SYRINGE
25-50 AS NEEDED
Status: DISCONTINUED | OUTPATIENT
Start: 2018-08-19 | End: 2018-08-20 | Stop reason: HOSPADM

## 2018-08-19 RX ORDER — ASPIRIN 81 MG/1
81 TABLET ORAL DAILY
Status: DISCONTINUED | OUTPATIENT
Start: 2018-08-19 | End: 2018-08-20 | Stop reason: HOSPADM

## 2018-08-19 RX ADMIN — WARFARIN SODIUM 1 MG: 1 TABLET ORAL at 17:53

## 2018-08-19 RX ADMIN — LISINOPRIL 5 MG: 5 TABLET ORAL at 10:31

## 2018-08-19 RX ADMIN — FAMOTIDINE 20 MG: 20 TABLET ORAL at 10:31

## 2018-08-19 RX ADMIN — FAMOTIDINE 20 MG: 20 TABLET ORAL at 17:53

## 2018-08-19 RX ADMIN — FUROSEMIDE 60 MG: 10 INJECTION, SOLUTION INTRAMUSCULAR; INTRAVENOUS at 17:54

## 2018-08-19 RX ADMIN — ASPIRIN 81 MG: 81 TABLET, COATED ORAL at 10:31

## 2018-08-19 RX ADMIN — NYSTATIN: 100000 POWDER TOPICAL at 21:45

## 2018-08-19 RX ADMIN — INSULIN LISPRO 2 UNITS: 100 INJECTION, SOLUTION INTRAVENOUS; SUBCUTANEOUS at 12:55

## 2018-08-19 RX ADMIN — FUROSEMIDE 60 MG: 10 INJECTION, SOLUTION INTRAMUSCULAR; INTRAVENOUS at 04:02

## 2018-08-19 RX ADMIN — ATORVASTATIN CALCIUM 20 MG: 20 TABLET, FILM COATED ORAL at 21:44

## 2018-08-19 RX ADMIN — METOPROLOL SUCCINATE 50 MG: 50 TABLET, EXTENDED RELEASE ORAL at 10:32

## 2018-08-19 RX ADMIN — ATORVASTATIN CALCIUM 20 MG: 20 TABLET, FILM COATED ORAL at 04:02

## 2018-08-19 RX ADMIN — ALLOPURINOL 100 MG: 100 TABLET ORAL at 10:31

## 2018-08-19 NOTE — ROUTINE PROCESS
0200: Received patient from ER via stretcher. A&Ox4. On oxygen at 2 LPM. Dyspneic on exertion. Oriented patient to room & surroundings. Call light within reach. Assessment done. C/o RLE pain but refused medication. Instructed patient on 1200 ml/day fluid restrictions. Verbalized understanding.    0402: Due meds given. 0645: Slept good. Needs attended. 9647: Patients own Inhalation meds sent down to pharmacy & verified. 0745:Bedside and Verbal shift change report given to Rachel Ordonez RN (oncoming nurse) by me (offgoing nurse). Report included the following information SBAR, Kardex, Intake/Output, MAR and Recent Results.

## 2018-08-19 NOTE — PROGRESS NOTES
attempted to conduct an initial consultation and spiritual assessment for Rakesh Barnes, who is a 48 y. o.female. However, patient was unavailable, busy with staff. Patients primary language is: [place]. According to the patients EMR, her [employer] affiliation is: No preference. The  provided the following interventions:  Offered silent prayer on patient's behalf. Reviewed chart. Plan:  Chaplains will continue our attempts to connect with patient and then provide pastoral care on an as-needed/requested basis.     [facility] Certified [address] [place] [address],[address] [place] [address]    [phone]

## 2018-08-19 NOTE — PROGRESS NOTES
Problem: Falls - Risk of  Goal: *Absence of Falls  Document Rahul Fall Risk and appropriate interventions in the flowsheet.    Outcome: Progressing Towards Goal  Fall Risk Interventions:  Mobility Interventions: Communicate number of staff needed for ambulation/transfer, OT consult for ADLs, Patient to call before getting OOB, PT Consult for mobility concerns, PT Consult for assist device competence, Strengthening exercises (ROM-active/passive), Utilize walker, cane, or other assistive device         Medication Interventions: Patient to call before getting OOB, Teach patient to arise slowly    Elimination Interventions: Call light in reach, Patient to call for help with toileting needs, Toilet paper/wipes in reach, Toileting schedule/hourly rounds

## 2018-08-19 NOTE — ROUTINE PROCESS
1945: Assumed care. Awake. Resting in bed. Denies any pain or discomfort at this time. Call light within reach. 2029: Called [facility] for CPAP clearance. Ticket # [id number].    2145: Due meds given. . No coverage provided per protocol. HS snack given per patient request.    0023: No change from previous assessment. 0100: Sleeping w/ her CPAP on.    0324: Sleeping. 2176: No change from previous assessment. 9177: Due med given. 0645: slept good thru night. Needs attended. 3914: Bedside and Verbal shift change report given to Yolande Daigle RN (oncoming nurse) by me (offgoing nurse). Report included the following information SBAR, Kardex, Intake/Output, MAR and Recent Results.

## 2018-08-19 NOTE — PROGRESS NOTES
Reason for Admission:   chf                   RRAT Score:          6           Plan for utilizing home health:   no                       Likelihood of Readmission:  high                         Transition of Care Plan:  Spoke with pt, she just moved here from [facility]. She states she gave her [place] per up, needs to apply here. Referral to jean in med assist.      She states she is homeless. She has a rolater in  and cpap machine. She states she leaves them with her sister when she is outside. Asked her if can stay with sister, she stated no. She needs pcp referral to . also for shelter list. Since she has a check maybe they can find her a room. Order placed for . Sister's address is [address] [place] [address] .

## 2018-08-19 NOTE — PROGRESS NOTES
Problem: Mobility Impaired (Adult and Pediatric)  Goal: *Acute Goals and Plan of Care (Insert Text)  Physical Therapy Goals  Initiated 8/19/2018 and to be accomplished within 7 day(s)  1. Patient will move from supine to sit and sit to supine , scoot up and down and roll side to side in bed with modified independence. 2.  Patient will transfer from bed to chair and chair to bed with modified independence using the least restrictive device. 3.  Patient will perform sit to stand with modified independence. 4.  Patient will ambulate with modified independence for >/= 150 feet with the least restrictive device. physical Therapy EVALUATION    Patient: Briana Lyles (18 y.o. female)  Date: 8/19/2018  Primary Diagnosis: Acute on chronic congestive heart failure ([facility]        Precautions:      PLOF: Ambulatory with rollator. ASSESSMENT :   Patient requires between supervision/set-up and contact guard assist for bed mobility, transfers and ambulation. Demonstrated decreased activity tolerance and c/o 10/10 pain right LE with weight bearing. Told has gout. Patient demonstrated increased SOB with gait.  bpm and SpO2 97% after gait in room air. Will benefit from skilled PT intervention to increase overall functional mobility independence and safety. Patient presents with deficits in:   Bed Mobility, Transfers and Gait    Patient will benefit from skilled intervention to address the above impairments.   Patients rehabilitation potential is considered to be Good  Factors which may influence rehabilitation potential include:   []         None noted  []         Mental ability/status  [x]         Medical condition  []         Home/family situation and support systems  []         Safety awareness  []         Pain tolerance/management  []         Other:     Recommendations for nursing:   Written on communication board: OOB with assist of 1  Verbally communicated to: nurse Dudley     PLAN :  Recommendations and Planned Interventions:*  [x]           Bed Mobility Training             []    Neuromuscular Re-Education  [x]           Transfer Training                   []    Orthotic/Prosthetic Training  [x]           Gait Training                          []    Modalities  []           Therapeutic Exercises          []    Edema Management/Control  []           Therapeutic Activities            [x]    Patient and Family Training/Education*  [x]           Other (comment):Plan of care, transfer and gait with rollator    Frequency/Duration: Patient will be followed by physical therapy 1 time per day/3-5 days per week to address goals. Discharge Recommendations: ? Outpatient, per patient - homeless and medicaid pending as just moved to [address] [place] [address] Recommendations for Discharge: NA     SUBJECTIVE:   Patient stated I am homeless. My right leg is hurting.   Reports told she has gout. OBJECTIVE DATA SUMMARY:     Past Medical History:   Diagnosis Date    [facility]congestive heart failure) ([facility]     Cirrhosis (HCC)     COPD (chronic obstructive pulmonary disease) (HCC)     Gout    No past surgical history on file. Barriers to Learning/Limitations: yes;  none  Compensate with: visual, verbal, tactile, kinesthetic cues/model    G CODE:Mobility  Current  CK= 40-59%   Goal  CI= 1-19%.   The severity rating is based on the Other [facility] Balance Scale    Eval Complexity: History: MEDIUM  Complexity : 1-2 comorbidities / personal factors will impact the outcome/ POC Exam:MEDIUM Complexity : 3 Standardized tests and measures addressing body structure, function, activity limitation and / or participation in recreation  Presentation: MEDIUM Complexity : Evolving with changing characteristics  Clinical Decision Making:Medium Complexity [facility] Standing Balance Scale Overall Complexity:MEDIUM    [address] Sitting Balance Scale  0: Pt performs 25% or less of sitting activity (Max assist) CN, 100% impaired. 1: Pt supports self with upper extremities but requires therapist assistance. Pt performs 25-50% of effort (Mod assist) CM, 80% to <100% impaired. 1+: Pt supports self with upper extremities but requires therapist assistance. Pt performs >50% effort. (Min assist). CL, 60% to <80% impaired. 2: Pt supports self independently with both upper extremities. CL, 60% to <80% impaired. 2+: Pt support self independently with 1 upper extremity. CK, 40% to <60% impaired. 3: Pt sits without upper extremity support for up to 30 seconds. CK, 40% to <60% impaired. 3+: Pt sits without upper extremity support for 30 seconds or greater. CJ, 20% to <40% impaired. 4: Pt moves and returns trunkal midpoint 1-2 inches in one plane. CJ, 20% to <40% impaired. 4+: Pt moves and returns trunkal midpoint 1-2 inches in multiple planes. CI, 1% to <20% impaired. 5: Pt moves and returns trunkal midpoint in all planes greater than 2 inches. CH, 0% impaired. [address] Standing Balance Scale  0: Pt performs 25% or less of standing activity (Max assist) CN, 100% impaired. 1: Pt supports self with upper extremities but requires therapist assistance. Pt performs 25-50% of effort (Mod assist) CM, 80% to <100% impaired. 1+: Pt supports self with upper extremities but requires therapist assistance. Pt performs >50% effort. (Min assist). CL, 60% to <80% impaired. 2: Pt supports self independently with both upper extremities (walker, crutches, parallel bars). CL, 60% to <80% impaired. 2+: Pt support self independently with 1 upper extremity (cane, crutch, 1 parallel bar). CK, 40% to <60% impaired. 3: Pt stands without upper extremity support for up to 30 seconds. CK, 40% to <60% impaired. 3+: Pt stands without upper extremity support for 30 seconds or greater. CJ, 20% to <40% impaired. 4: Pt independently moves and returns center of gravity 1-2 inches in one plane. CJ, 20% to <40% impaired.   4+: Pt independently moves and returns center of gravity 1-2 inches in multiple planes. CI, 1% to <20% impaired. 5: Pt independently moves and returns center of gravity in all planes greater than 2 inches. CH, 0% impaired. Prior Level of Function/Home Situation: Ambulatory with rollator. Currently homeless. Home Situation  Home Environment: Other (comment) (Homeless but goes to [facility]  to shower. From [place])  # Steps to Enter: 0  One/Two Story Residence: Other (Comment)  # of Interior Steps: 0  Height of Each Step (in): 0 inches  Interior Rails: None  Lift Chair Available: No  Living Alone: Yes  Support Systems: None  Patient Expects to be Discharged to[de-identified] Unknown (homeless)  Current DME Used/Available at Home: CPAP, Walker, rollator  Critical Behavior:  Neurologic State: Alert  Orientation Level: Oriented X4  Cognition: Follows commands  Safety/Judgement: Awareness of environment; Fall prevention  Psychosocial  Patient Behaviors: Calm; Cooperative  Purposeful Interaction: Yes      Strength:    Strength: Generally decreased, functional (both LE)      Tone & Sensation:   Tone: Normal (both LE)      Range Of Motion:  AROM: Within functional limits (both LE)      Functional Mobility:  Bed Mobility:  Supine to Sit: Supervision  Sit to Supine: Supervision  Transfers:  Sit to Stand: Contact guard assistance; Additional time  Stand to Sit: Stand-by assistance  Balance:   Sitting - Static: Good (unsupported)  Sitting - Dynamic: Good (unsupported)  Standing: With support  Standing - Static: Fair  Standing - Dynamic : Fair (Minus)  Ambulation/Gait Training:  Distance (ft): 60 Feet (ft)  Assistive Device: Walker, rollator  Ambulation - Level of Assistance: Contact guard assistance  Gait Abnormalities: Decreased step clearance; Toe walking (R LE)  Speed/Tiffanie: Pace decreased (<100 feet/min)  Step Length: Left shortened;Right shortened     Pain:  Pre treatment pain level:  10, right LE  Post treatment pain level:  10, right LE  Pain Scale 1: Numeric (0 - 10)  Pain Intensity 1: 10  Pain Location 1: Leg  Pain Orientation 1: Lower;Right  Pain Description 1: Aching  Pain Intervention(s) 1: Declines     Activity Tolerance:  Fair Minus, increased SOB noted with gait.  bpm and SpO2 97% after gait in room air. Please refer to the flowsheet for vital signs taken during this treatment. After treatment: *  []         Patient left in no apparent distress sitting up in chair  [x]         Patient left in no apparent distress in bed  [x]         Call bell left within reach  [x]         Nursing notified  []         Caregiver present  []         Bed alarm activated    COMMUNICATION/EDUCATION:   [x]         Fall prevention education was provided and the patient/caregiver indicated understanding. [x]         Patient/family have participated as able in goal setting and plan of care. [x]         Patient/family agree to work toward stated goals and plan of care. []         Patient understands intent and goals of therapy, but is neutral about his/her participation. []         Patient is unable to participate in goal setting and plan of care.     Thank you for this referral.  Saleem Farrar, PT   Time Calculation: 24 mins

## 2018-08-19 NOTE — H&P
Medicine History and Physical    Patient: Tati Bolanos   Age:  48 y.o. Assessment   Principal Problem:    Acute on chronic heart failure (HCC) (8/19/2018)    Active Problems:    Gout (8/19/2018)      COPD (chronic obstructive pulmonary disease) ([facility] (8/19/2018)      DM (diabetes mellitus) ([facility] (8/19/2018)      HLD (hyperlipidemia) (8/19/2018)      Acute on chronic congestive heart failure (HCC) (8/19/2018)      LISSETH (obstructive sleep apnea) (8/19/2018)      Alcohol intoxication ([facility] (8/19/2018)          Plan     1)  Acute on chronic CHF   - echo   - tele   - IV lasix   - continue BB, ASA, warfarin    2)  COPD   - continue home medications    3)  Gout    - continue home medications    4)  DM   - SSI    5)  LISSETH   - home cpap    6)  Alcohol intoxication   - per patient not a daily drinker    DISPO  -Pt to be admitted  at this time for reasons addressed above, continued hospitalization for ongoing assessment and treatment indicated     Anticipated Date of Discharge: 3 days  Anticipated Disposition (home, SNF) : home    Chief Complaint:   Chief Complaint   Patient presents with    Pain (Chronic)    Shortness of Breath         HPI:   Tati Bolanos is a 48y.o. year old female who presents with  Increased SOB. States she has been visiting from [facility] since may and since then has not seen a physician and has just been going to ED's when sick. Per patient 1 week of SOB no phlegm and increased edema. She has \"no place to stay\" so she has been non complaint with cpap. She was drinking with some friends tonight and got SOB so came into ED. She has a hx of CHF, DM, HTN. Review of Systems - positive responses in bold type   Constitutional: Negative for fever, chills, diaphoresis and unexpected weight change. HENT: Negative for ear pain, congestion, sore throat, rhinorrhea, drooling, trouble swallowing, neck pain and tinnitus. Eyes: Negative for photophobia, pain, redness and visual disturbance.    Respiratory: negative for shortness of breath, cough, choking, chest tightness, wheezing or stridor. Cardiovascular: Negative for chest pain, palpitations and leg swelling. Gastrointestinal: Negative for nausea, vomiting, abdominal pain, diarrhea, constipation, blood in stool, abdominal distention and anal bleeding. Genitourinary: Negative for dysuria, urgency, frequency, hematuria, flank pain and difficulty urinating. Musculoskeletal: Negative for back pain and arthralgias. Skin: Negative for color change, rash and wound. Neurological: Negative for dizziness, seizures, syncope, speech difficulty, light-headedness or headaches. Hematological: Does not bruise/bleed easily. Psychiatric/Behavioral: Negative for suicidal ideas, hallucinations, behavioral problems, self-injury or agitation       Past Medical History:  Past Medical History:   Diagnosis Date    CHF (congestive heart failure) (HCC)     Cirrhosis (HCC)     COPD [facility]chronic obstructive pulmonary disease) ([facility]     Gout        Past Surgical History:  No past surgical history on file. Family History:  No family history on file. Social History:  Social History     Social History    Marital status: SINGLE     Spouse name: N/A    Number of children: N/A    Years of education: N/A     Social History Main Topics    Smoking status: Not on file    Smokeless tobacco: Not on file    Alcohol use Not on file    Drug use: Not on file    Sexual activity: Not on file     Other Topics Concern    Not on file     Social History Narrative    No narrative on file       Home Medications:  Prior to Admission medications    Medication Sig Start Date End Date Taking? Authorizing Provider   famotidine (PEPCID) 20 mg tablet Take 20 mg by mouth two (2) times a day. Yes Martin Fowler MD   metoprolol succinate (TOPROL-XL) 50 mg XL tablet Take 50 mg by mouth daily.    Yes Martin Fowler MD   predniSONE (STERAPRED DS) 10 mg dose pack Take 10 mg by mouth See Admin Instructions. See administration instruction per 10mg dose pack   Yes Martin Fowler MD   atorvastatin (LIPITOR) 20 mg tablet Take 20 mg by mouth nightly. Yes Martin Fowler MD   lisinopril (PRINIVIL, ZESTRIL) 5 mg tablet Take 5 mg by mouth daily. Maria Fernanda Fowler MD   allopurinol (ZYLOPRIM) 100 mg tablet Take 100 mg by mouth daily. Yes Phys Other, MD   potassium chloride (KLOR-CON M10) 10 mEq tablet Take 10 mEq by mouth daily. Yes Martin Fowler MD   furosemide (LASIX) 40 mg tablet Take 40 mg by mouth two (2) times a day. Maria Fernanda Fowler MD   warfarin (COUMADIN) 1 mg tablet Take 1 mg by mouth daily. 2 tablets daily or as directed by clinic   Yes Martin Fowler MD   metFORMIN (GLUCOPHAGE) 500 mg tablet Take 500 mg by mouth two (2) times daily (with meals). Yes Phys Other, MD   aspirin delayed-release 81 mg tablet Take 81 mg by mouth daily. Yes Martin Fowler MD   umeclidinium (INCRUSE ELLIPTA) 62.5 mcg/actuation inhaler Take 1 Puff by inhalation daily. Yes Martin Fowler MD   fluticasone-vilanterol (BREO ELLIPTA) 200-25 mcg/dose inhaler Take 1 Puff by inhalation daily. Yes Martin Fowler MD   albuterol (VENTOLIN HFA) 90 mcg/actuation inhaler Take 2 Puffs by inhalation every four (4) hours as needed for Wheezing. Martin Fowler MD       Allergies:  No Known Allergies        Physical Exam:     Visit Vitals    /56    Pulse (!) 115    Temp 98 °F (36.7 °C)    Resp (!) 33    Ht 5' 5\" (1.651 m)    Wt 88.5 kg (195 lb)    SpO2 97%    BMI 32.45 kg/m2       Physical Exam:  General appearance: lethargic but arousible  cooperative, no distress, appears stated age  Head: Normocephalic, without obvious abnormality, atraumatic  Neck: supple, trachea midline  Lungs: b/l decreased breath sounds with basilar rales  Heart: regular rate and rhythm, S1, S2 normal, no murmur, click, rub or gallop  Abdomen: soft, non-tender.  Bowel sounds normal. No masses,  no organomegaly  Extremities: 2+ b/l pitting edema  Skin: Skin color, texture, turgor normal. No rashes or lesions  Neurologic: Grossly normal    Intake and Output:  Current Shift:     Last three shifts:       Lab/Data Reviewed:  CMP:   Lab Results   Component Value Date/Time     08/18/2018 05:50 PM    K 3.8 08/18/2018 05:50 PM     08/18/2018 05:50 PM    CO2 29 08/18/2018 05:50 PM    AGAP 10 08/18/2018 05:50 PM     (H) 08/18/2018 05:50 PM    BUN 21 (H) 08/18/2018 05:50 PM    CREA 0.96 08/18/2018 05:50 PM    GFRAA >60 08/18/2018 05:50 PM    GFRNA >60 08/18/2018 05:50 PM    CA 9.3 08/18/2018 05:50 PM    MG 2.1 08/18/2018 05:50 PM     CBC:   Lab Results   Component Value Date/Time    WBC 10.2 08/18/2018 05:50 PM    HGB 14.0 08/18/2018 05:50 PM    HCT 44.5 08/18/2018 05:50 PM     08/18/2018 05:50 PM     Recent Glucose Results:   Lab Results   Component Value Date/Time     (H) 08/18/2018 05:50 PM       Chest X-Ray is obtained; CXR reviewed independently -b/l pulm edema      Steve Chacon MD    August 19, 2018

## 2018-08-19 NOTE — PROGRESS NOTES
Problem: Falls - Risk of  Goal: *Absence of Falls  Document Rahul Fall Risk and appropriate interventions in the flowsheet.    Outcome: Progressing Towards Goal  Fall Risk Interventions:            Medication Interventions: Patient to call before getting OOB, Teach patient to arise slowly

## 2018-08-19 NOTE — ED NOTES
TRANSFER - ED to INPATIENT REPORT:    SBAR report made available to receiving floor on this patient being transferred to [address] [place] [address] ([facility])  for routine progression of care       Admitting diagnosis Acute on chronic congestive heart failure ([facility]    Information from the following report(s) SBAR was made available to receiving floor. Lines:   Peripheral IV 08/18/18 Right Arm (Active)   Site Assessment Clean, dry, & intact 8/18/2018  8:21 PM   Phlebitis Assessment 0 8/18/2018  8:21 PM   Infiltration Assessment 0 8/18/2018  8:21 PM   Dressing Status Clean, dry, & intact 8/18/2018  8:21 PM   Dressing Type Transparent 8/18/2018  8:21 PM   Hub Color/Line Status Pink 8/18/2018  8:21 PM        Medication list confirmed with patient    Opportunity for questions and clarification was provided.       Patient is oriented to time, place, person and situation   Patient is  continent and ambulatory with assist     Valuables transported with patient     Patient transported with:   Monitor  O2 @ 2 liters  Registered Nurse

## 2018-08-19 NOTE — PROGRESS NOTES
Admit early AM for acute on chronic CHF. Cont IV diuresis. Has put out over 1L thus far. Echo.  Tele monitor    Active Hospital Problems    Diagnosis Date Noted    Gout 08/19/2018    COPD (chronic obstructive pulmonary disease) ([facility] 08/19/2018    [facility] ([facility] 08/19/2018    DM (diabetes mellitus) ([facility] 08/19/2018    HLD (hyperlipidemia) 08/19/2018    Acute on chronic diastolic congestive heart failure ([facility] 08/19/2018    LISSETH (obstructive sleep apnea) 08/19/2018    Alcohol intoxication ([facility] 08/19/2018         Nona Wu, DO  Internal Medicine, Hospitalist  Pager: [phone]

## 2018-08-20 VITALS
OXYGEN SATURATION: 99 % | RESPIRATION RATE: 20 BRPM | HEIGHT: 61 IN | WEIGHT: 212 LBS | TEMPERATURE: 98.2 F | SYSTOLIC BLOOD PRESSURE: 136 MMHG | HEART RATE: 84 BPM | DIASTOLIC BLOOD PRESSURE: 82 MMHG | BODY MASS INDEX: 40.02 KG/M2

## 2018-08-20 PROBLEM — I50.33 ACUTE ON CHRONIC DIASTOLIC CONGESTIVE HEART FAILURE (HCC): Status: ACTIVE | Noted: 2018-08-19

## 2018-08-20 LAB
ANION GAP SERPL CALC-SCNC: 10 MMOL/L (ref 3–18)
BUN SERPL-MCNC: 28 MG/DL (ref 7–18)
BUN/CREAT SERPL: 30 (ref 12–20)
CALCIUM SERPL-MCNC: 9.3 MG/DL (ref 8.5–10.1)
CHLORIDE SERPL-SCNC: 98 MMOL/L (ref 100–108)
CO2 SERPL-SCNC: 31 MMOL/L (ref 21–32)
CREAT SERPL-MCNC: 0.92 MG/DL (ref 0.6–1.3)
GLUCOSE BLD STRIP.AUTO-MCNC: 113 MG/DL (ref 70–110)
GLUCOSE BLD STRIP.AUTO-MCNC: 147 MG/DL (ref 70–110)
GLUCOSE SERPL-MCNC: 113 MG/DL (ref 74–99)
INR PPP: 2.5 (ref 0.8–1.2)
POTASSIUM SERPL-SCNC: 4.2 MMOL/L (ref 3.5–5.5)
PROTHROMBIN TIME: 27.5 SEC (ref 11.5–15.2)
SODIUM SERPL-SCNC: 139 MMOL/L (ref 136–145)

## 2018-08-20 PROCEDURE — 36415 COLL VENOUS BLD VENIPUNCTURE: CPT | Performed by: HOSPITALIST

## 2018-08-20 PROCEDURE — 74011250636 HC RX REV CODE- 250/636: Performed by: HOSPITALIST

## 2018-08-20 PROCEDURE — 74011250637 HC RX REV CODE- 250/637: Performed by: HOSPITALIST

## 2018-08-20 PROCEDURE — 85610 PROTHROMBIN TIME: CPT | Performed by: HOSPITALIST

## 2018-08-20 PROCEDURE — 82962 GLUCOSE BLOOD TEST: CPT

## 2018-08-20 PROCEDURE — 80048 BASIC METABOLIC PNL TOTAL CA: CPT | Performed by: HOSPITALIST

## 2018-08-20 RX ADMIN — ALLOPURINOL 100 MG: 100 TABLET ORAL at 09:00

## 2018-08-20 RX ADMIN — LISINOPRIL 5 MG: 5 TABLET ORAL at 09:00

## 2018-08-20 RX ADMIN — NYSTATIN: 100000 POWDER TOPICAL at 09:00

## 2018-08-20 RX ADMIN — FUROSEMIDE 60 MG: 10 INJECTION, SOLUTION INTRAMUSCULAR; INTRAVENOUS at 05:29

## 2018-08-20 RX ADMIN — FAMOTIDINE 20 MG: 20 TABLET ORAL at 09:00

## 2018-08-20 RX ADMIN — ASPIRIN 81 MG: 81 TABLET, COATED ORAL at 09:00

## 2018-08-20 RX ADMIN — METOPROLOL SUCCINATE 50 MG: 50 TABLET, EXTENDED RELEASE ORAL at 09:00

## 2018-08-20 NOTE — PROGRESS NOTES
Problem: Falls - Risk of  Goal: *Absence of Falls  Document Rahul Fall Risk and appropriate interventions in the flowsheet.    Outcome: Progressing Towards Goal  Fall Risk Interventions:  Mobility Interventions: Utilize walker, cane, or other assistive device         Medication Interventions: Patient to call before getting OOB, Teach patient to arise slowly    Elimination Interventions: Call light in reach

## 2018-08-20 NOTE — PROGRESS NOTES
Assumed care of patient from ROXANNA Childress (offgoing nurse). Patient awake in bed, NAD, denies pain. Playing on cell phone. Bed locked and in lowest position with call bell and frequently used items in reach. Encouraged to call for assistance, verbalized understanding. 1035- CNA performed walk test with patient. Patient resting on room air 96% oxygen saturation. Patient ambulating on room air in hallway at 97% oxygen saturation. Patient returned to bed, NAD.     1235- Paged Shirlene Chandra in case management, Patient states she has no place to go upon discharge, and someone was supposed to come talk to her about medicaid, but has not. 80- Patient on phone with homeless shelter. 1415- [facility]. 1500- Patient discharge to [facility], escorted to front entrance with CNA. Patient has discharge instructions along with belongings.

## 2018-08-20 NOTE — DISCHARGE SUMMARY
2 [facility]  Hospitalist Division    Discharge Summary    Patient: Gildardo Ponce MRN: [id number]  CSN: [id number]    YOB: 1965  Age: 48 y.o. Sex: female    DOA: 8/18/2018 LOS:  LOS: 1 day   Discharge Date: 8/20/2018     Admission Diagnoses: Acute on chronic congestive heart failure [facility])    Discharge Diagnoses:    Problem List as of 8/20/2018  Never Reviewed          Codes Class Noted - Resolved    Gout ICD-10-CM: M10.9  ICD-9-CM: 274.9  8/19/2018 - Present        COPD (chronic obstructive pulmonary disease) ([facility] ICD-10-CM: J44.9  ICD-9-CM: 794  8/19/2018 - Present        * (Principal)Acute on chronic heart failure ([facility] ICD-10-CM: I50.9  ICD-9-CM: 428.0  8/19/2018 - Present        Acute respiratory failure with hypoxia (HCC) ICD-10-CM: J96.01  ICD-9-CM: 518.81  8/19/2018 - Present        DM (diabetes mellitus) ([facility] ICD-10-CM: E11.9  ICD-9-CM: 250.00  8/19/2018 - Present        HLD (hyperlipidemia) ICD-10-CM: E78.5  ICD-9-CM: 272.4  8/19/2018 - Present        Acute on chronic congestive heart failure (HCC) ICD-10-CM: I50.9  ICD-9-CM: 428.0  8/19/2018 - Present        LISSETH (obstructive sleep apnea) ICD-10-CM: G47.33  ICD-9-CM: 327.23  8/19/2018 - Present        Alcohol intoxication ([facility] ICD-10-CM: P62.138  ICD-9-CM: 305.00  8/19/2018 - Present              Discharge Condition: Stable    Discharge To: Home    Consults: [facility] Course: Gildardo Ponce is a 48y.o. year old female who presented to ED with c/o increased SOB. Stated she has been visiting from [place] since May and since then has not seen a physician and has just been going to ED's when sick. Per patient she has had 1 week of SOB, no phlegm and increased edema. She has \"no place to stay\" so she has been non complaint with cpap. She was drinking with some friends on night of ED presentation and got SOB so came into ED. She has a hx of CHF, DM, HTN. Patient was admitted for further management. Patient diuresed with IV lasix. Shortness of breath resolved and patient was weaned off of O2. Patient is appropriate for discharge home with instructions to follow up with PCP within 1 week. Physical Exam:  General appearance: alert, cooperative, no distress, appears stated age  Lungs: clear to auscultation bilaterally  Heart: regular rate and rhythm, S1, S2 normal, no murmur, click, rub or gallop  Abdomen: soft, non-tender. Bowel sounds normoactive   Extremities: extremities normal, atraumatic, no cyanosis.  Trace non-pitting BLE edema   Skin: Skin color, texture, turgor normal. No rashes or lesions  Neurologic: Grossly normal  PSY: Mood and affect normal, appropriately behaved    Significant Diagnostic Studies:   Recent Results (from the past 24 hour(s))   TROPONIN I    Collection Time: 08/19/18  2:12 PM   Result Value Ref Range    Troponin-I, Qt. <0.02 0.0 - 0.045 NG/ML   PROTHROMBIN TIME + INR    Collection Time: 08/19/18  2:12 PM   Result Value Ref Range    Prothrombin time 30.8 (H) 11.5 - 15.2 sec    INR 2.9 (H) 0.8 - 1.2     GLUCOSE, POC    Collection Time: 08/19/18  4:38 PM   Result Value Ref Range    Glucose (POC) 119 (H) 70 - 110 mg/dL   GLUCOSE, POC    Collection Time: 08/19/18  9:17 PM   Result Value Ref Range    Glucose (POC) 145 (H) 70 - 110 mg/dL   PROTHROMBIN TIME + INR    Collection Time: 08/20/18  4:29 AM   Result Value Ref Range    Prothrombin time 27.5 (H) 11.5 - 15.2 sec    INR 2.5 (H) 0.8 - 1.2     METABOLIC PANEL, BASIC    Collection Time: 08/20/18  4:29 AM   Result Value Ref Range    Sodium 139 136 - 145 mmol/L    Potassium 4.2 3.5 - 5.5 mmol/L    Chloride 98 (L) 100 - 108 mmol/L    CO2 31 21 - 32 mmol/L    Anion gap 10 3.0 - 18 mmol/L    Glucose 113 (H) 74 - 99 mg/dL    BUN 28 (H) 7.0 - 18 MG/DL    Creatinine 0.92 0.6 - 1.3 MG/DL    BUN/Creatinine ratio 30 (H) 12 - 20      GFR est AA >60 >60 ml/min/1.73m2    GFR est non-AA >60 >60 ml/min/1.73m2    Calcium 9.3 8.5 - 10.1 MG/DL   GLUCOSE, POC    Collection Time: 08/20/18  7:21 AM   Result Value Ref Range    Glucose (POC) 113 (H) 70 - 110 mg/dL   GLUCOSE, POC    Collection Time: 08/20/18 12:33 PM   Result Value Ref Range    Glucose (POC) 147 (H) 70 - 110 mg/dL         Discharge Medications:     Current Discharge Medication List      CONTINUE these medications which have NOT CHANGED    Details   famotidine (PEPCID) 20 mg tablet Take 20 mg by mouth two (2) times a day. metoprolol succinate (TOPROL-XL) 50 mg XL tablet Take 50 mg by mouth daily. atorvastatin (LIPITOR) 20 mg tablet Take 20 mg by mouth nightly. lisinopril (PRINIVIL, ZESTRIL) 5 mg tablet Take 5 mg by mouth daily. allopurinol (ZYLOPRIM) 100 mg tablet Take 100 mg by mouth daily. potassium chloride (KLOR-CON M10) 10 mEq tablet Take 10 mEq by mouth daily. furosemide (LASIX) 40 mg tablet Take 40 mg by mouth two (2) times a day. warfarin (COUMADIN) 1 mg tablet Take 1 mg by mouth daily. 2 tablets daily or as directed by clinic      metFORMIN (GLUCOPHAGE) 500 mg tablet Take 500 mg by mouth two (2) times daily (with meals). aspirin delayed-release 81 mg tablet Take 81 mg by mouth daily. umeclidinium (INCRUSE ELLIPTA) 62.5 mcg/actuation inhaler Take 1 Puff by inhalation daily. fluticasone-vilanterol (BREO ELLIPTA) 200-25 mcg/dose inhaler Take 1 Puff by inhalation daily. albuterol (VENTOLIN HFA) 90 mcg/actuation inhaler Take 2 Puffs by inhalation every four (4) hours as needed for Wheezing.          STOP taking these medications       predniSONE (STERAPRED DS) 10 mg dose pack Comments:   Reason for Stopping:               Activity: Activity as tolerated    Diet: Cardiac, diabet Diet    Wound Care: None needed    Follow-up:   PCP within 1 week     Discharge time: > 35 minutes   Navi Garcia NP  8/20/2018, 12:00 PM

## 2018-08-20 NOTE — ROUTINE PROCESS
Bedside, Verbal and Written shift change report given to Wei Ontiveros RN (oncoming nurse) by Marques De Anda RN (offgoing nurse). Report included the following information SBAR, Kardex, Intake/Output, MAR, Recent Results, Med Rec Status, Procedure Summary and Cardiac Rhythm ST.      0740 - Shift assessment completed. Pt alert and oriented x4. No respiratory distress noted. No c/o pain reported. Call bell within reach, bed in low position. Will continue to monitor.      1240 - Shift re-assessment completed, no change in pt condition.      1400 - Dr Aaron Hendrix on unit, notified pt's on PO Coumadin and pt did not have a PT/INR lab draw this morning. Dr Aaron Hendrix ordered for PT/INR to be drawn today and then daily. 1640 - Shift re-assessment completed, no change in pt condition.      1530 - Paged Dr Aaron Hendrix, awaiting return call back. 1 - Dr Aaron Hendrix returned call, notified pt takes Nystatin powder at home for itching under bilateral breasts. Dr Aaron Hendrix ordered Nystatin powder. Bedside, Verbal and Written shift change report given to Marques De Anda RN (oncoming nurse) by Wei Ontiveros RN (offgoing nurse). Report included the following information SBAR, Kardex, Intake/Output, MAR, Recent Results, Med Rec Status, Procedure Summary and Cardiac Rhythm NSR/ST.

## 2018-08-20 NOTE — ANCILLARY DISCHARGE INSTRUCTIONS
Spoke with patient, patient states that she can stay with her sister during the day, but has to find a place to stay at night time. Patient given [employer] to call for an interview, [employer], and the homeless crisis hotline. Patient is calling listed phone numbers.

## 2018-08-20 NOTE — PROGRESS NOTES
Pt approached at [phone], however refused PT stating, \"I already did my PT today. I got up and walked. \"  Pt ed on benefit of PT, however politely refuses. Thank you,  Omaira Nieto, PT

## 2018-08-20 NOTE — PROGRESS NOTES
Problem: Falls - Risk of  Goal: *Absence of Falls  Document Rahul Fall Risk and appropriate interventions in the flowsheet.    Outcome: Progressing Towards Goal  Fall Risk Interventions:  Mobility Interventions: Communicate number of staff needed for ambulation/transfer, Patient to call before getting OOB         Medication Interventions: Patient to call before getting OOB, Evaluate medications/consider consulting pharmacy    Elimination Interventions: Call light in reach, Patient to call for help with toileting needs

## 2018-08-20 NOTE — CDMP QUERY
Please clarify if this pt's Chf is     =>Acute Diastolic Chf or Acute Systolic Chf   =>Other Explanation of clinical findings  =>Unable to Determine (no explanation of clinical findings)    The medical record reflects the following:    Risk:   hx of cirrhosis, gout, copd       Clinical Indicators:  Estimated left ventricular ejection fraction is 61 - 65%. Visually measured ejection fraction. Abnormal left ventricular septal motion. Diastolic flattening of the interventricular septum consistent with right ventricle volume overload. Treatment:  Lasix     Please clarify and document your clinical opinion in the progress notes and discharge summary including the definitive and/or presumptive diagnosis, (suspected or probable), related to the above clinical findings. Please include clinical findings supporting your diagnosis. If you DECLINE this query or would like to communicate with [employer], please utilize the \"[employer] message box\" at the TOP of the Progress Note on the right.       Thank you,  Gill Mendes RN/CCDS  [phone]

## 2018-08-20 NOTE — PROGRESS NOTES
conducted an initial consultation and Spiritual Assessment for Magalie Blum, who is a 48 y.o.,female. Patients Primary Language is: [place]. According to the patients EMR Jain Affiliation is: No preference. The reason the Patient came to the hospital is:   Patient Active Problem List    Diagnosis Date Noted    Gout 08/19/2018    COPD (chronic obstructive pulmonary disease) ([facility] 08/19/2018    Acute on chronic heart failure ([facility] 08/19/2018    [facility] 08/19/2018    DM (diabetes mellitus) ([facility] 08/19/2018    HLD (hyperlipidemia) 08/19/2018    Acute on chronic congestive heart failure ([facility] 08/19/2018    LISSETH (obstructive sleep apnea) 08/19/2018    Alcohol intoxication ([facility] 08/19/2018        The  provided the following Interventions:  Initiated a relationship of care and support. Explored issues of ani, spirituality and/or Anabaptist needs while hospitalized. Listened empathically. Provided chaplaincy education. Provided information about Spiritual Care Services. Offered prayer and assurance of continued prayers on patient's behalf. Chart reviewed. The following outcomes were achieved:  Patient shared some information about their medical narrative and spiritual journey/beliefs. Patient processed feeling about current hospitalization. Patient expressed gratitude for the 's visit. Assessment:  Patient did not indicate any spiritual or Anabaptist issues which require Spiritual Care Services interventions at this time. Patient does not have any Anabaptist/cultural needs that will affect patients preferences in health care. Plan:  Chaplains will continue to follow and will provide pastoral care on an as needed or requested basis.  recommends bedside caregivers page  on duty if patient shows signs of acute spiritual or emotional distress.     [facility]   Staff    Spiritual Care [phone]

## 2018-08-20 NOTE — DISCHARGE INSTRUCTIONS
DISCHARGE SUMMARY from Nurse    PATIENT INSTRUCTIONS:    After general anesthesia or intravenous sedation, for 24 hours or while taking prescription Narcotics:  · Limit your activities  · Do not drive and operate hazardous machinery  · Do not make important personal or business decisions  · Do  not drink alcoholic beverages  · If you have not urinated within 8 hours after discharge, please contact your surgeon on call. Report the following to your surgeon:  · Excessive pain, swelling, redness or odor of or around the surgical area  · Temperature over 100.5  · Nausea and vomiting lasting longer than 4 hours or if unable to take medications  · Any signs of decreased circulation or nerve impairment to extremity: change in color, persistent  numbness, tingling, coldness or increase pain  · Any questions    What to do at Home:  Recommended activity: Activity as tolerated    If you experience any of the following symptoms listed under Signs and Symptoms of a Heart Attack or Warning Signs of a Stroke, please follow up with your PCP and/or call 911. *  Please give a list of your current medications to your Primary Care Provider. *  Please update this list whenever your medications are discontinued, doses are      changed, or new medications (including over-the-counter products) are added. *  Please carry medication information at all times in case of emergency situations. These are general instructions for a healthy lifestyle:    No smoking/ No tobacco products/ Avoid exposure to second hand smoke  Surgeon General's Warning:  Quitting smoking now greatly reduces serious risk to your health.     Obesity, smoking, and sedentary lifestyle greatly increases your risk for illness    A healthy diet, regular physical exercise & weight monitoring are important for maintaining a healthy lifestyle    You may be retaining fluid if you have a history of heart failure or if you experience any of the following symptoms: Weight gain of 3 pounds or more overnight or 5 pounds in a week, increased swelling in our hands or feet or shortness of breath while lying flat in bed. Please call your doctor as soon as you notice any of these symptoms; do not wait until your next office visit. Recognize signs and symptoms of STROKE:    F-face looks uneven    A-arms unable to move or move unevenly    S-speech slurred or non-existent    T-time-call 911 as soon as signs and symptoms begin-DO NOT go       Back to bed or wait to see if you get better-TIME IS BRAIN. Warning Signs of HEART ATTACK     Call 911 if you have these symptoms:   Chest discomfort. Most heart attacks involve discomfort in the center of the chest that lasts more than a few minutes, or that goes away and comes back. It can feel like uncomfortable pressure, squeezing, fullness, or pain.  Discomfort in other areas of the upper body. Symptoms can include pain or discomfort in one or both arms, the back, neck, jaw, or stomach.  Shortness of breath with or without chest discomfort.  Other signs may include breaking out in a cold sweat, nausea, or lightheadedness. Don't wait more than five minutes to call 911 - MINUTES MATTER! Fast action can save your life. Calling 911 is almost always the fastest way to get lifesaving treatment. Emergency Medical Services staff can begin treatment when they arrive -- up to an hour sooner than if someone gets to the hospital by car. The discharge information has been reviewed with the patient. The patient verbalized understanding. Discharge medications reviewed with the patient and appropriate educational materials and side effects teaching were provided. ___________________________________________________________________________________________________________________________________     Heart Failure: Care Instructions  Your Care Instructions    Heart failure occurs when your heart does not pump as much blood as the body needs. Failure does not mean that the heart has stopped pumping but rather that it is not pumping as well as it should. Over time, this causes fluid buildup in your lungs and other parts of your body. Fluid buildup can cause shortness of breath, fatigue, swollen ankles, and other problems. By taking medicines regularly, reducing sodium (salt) in your diet, checking your weight every day, and making lifestyle changes, you can feel better and live longer. Follow-up care is a key part of your treatment and safety. Be sure to make and go to all appointments, and call your doctor if you are having problems. It's also a good idea to know your test results and keep a list of the medicines you take. How can you care for yourself at home? Medicines    · Be safe with medicines. Take your medicines exactly as prescribed. Call your doctor if you think you are having a problem with your medicine.     · Do not take any vitamins, over-the-counter medicine, or herbal products without talking to your doctor first. Cyrena [employer] not take ibuprofen (Advil or Motrin) and naproxen (Aleve) without talking to your doctor first. They could make your heart failure worse.     · You may be taking some of the following medicine. ¨ Beta-blockers can slow heart rate, decrease blood pressure, and improve your condition. Taking a beta-blocker may lower your chance of needing to be hospitalized. ¨ Angiotensin-converting enzyme inhibitors (ACEIs) reduce the heart's workload, lower blood pressure, and reduce swelling. Taking an ACEI may lower your chance of needing to be hospitalized again. ¨ Angiotensin II receptor blockers (ARBs) work like ACEIs. Your doctor may prescribe them instead of ACEIs. ¨ Diuretics, also called water pills, reduce swelling. ¨ Potassium supplements replace this important mineral, which is sometimes lost with diuretics.   ¨ Aspirin and other blood thinners prevent blood clots, which can cause a stroke or heart attack.    You will get more details on the specific medicines your doctor prescribes. Diet    · Your doctor may suggest that you limit sodium to 2,000 milligrams (mg) a day or less. That is less than 1 teaspoon of salt a day, including all the salt you eat in cooking or in packaged foods. People get most of their sodium from processed foods. Fast food and restaurant meals also tend to be very high in sodium.     · Ask your doctor how much liquid you can drink each day. You may have to limit liquids.    Weight    · Weigh yourself without clothing at the same time each day. Record your weight. Call your doctor if you have a sudden weight gain, such as more than 2 to 3 pounds in a day or 5 pounds in a week. (Your doctor may suggest a different range of weight gain.) A sudden weight gain may mean that your heart failure is getting worse.    Activity level    · Start light exercise (if your doctor says it is okay). Even if you can only do a small amount, exercise will help you get stronger, have more energy, and manage your weight and your stress. Walking is an easy way to get exercise. Start out by walking a little more than you did before. Bit by bit, increase the amount you walk.     · When you exercise, watch for signs that your heart is working too hard. You are pushing yourself too hard if you cannot talk while you are exercising. If you become short of breath or dizzy or have chest pain, stop, sit down, and rest.     · If you feel \"wiped out\" the day after you exercise, walk slower or for a shorter distance until you can work up to a better pace.     · Get enough rest at night. Sleeping with 1 or 2 pillows under your upper body and head may help you breathe easier.    Lifestyle changes    · Do not smoke. Smoking can make a heart condition worse. If you need help quitting, talk to your doctor about stop-smoking programs and medicines. These can increase your chances of quitting for good.  Quitting smoking may be the most important step you can take to protect your heart.     · Limit alcohol to 2 drinks a day for men and 1 drink a day for women. Too much alcohol can cause health problems.     · Avoid getting sick from colds and the flu. Get a pneumococcal vaccine shot. If you have had one before, ask your doctor whether you need another dose. Get a flu shot each year. If you must be around people with colds or the flu, wash your hands often. When should you call for help? Call 911 if you have symptoms of sudden heart failure such as:    · You have severe trouble breathing.     · You cough up pink, foamy mucus.     · You have a new irregular or rapid heartbeat.    Call your doctor now or seek immediate medical care if:    · You have new or increased shortness of breath.     · You are dizzy or lightheaded, or you feel like you may faint.     · You have sudden weight gain, such as more than 2 to 3 pounds in a day or 5 pounds in a week. (Your doctor may suggest a different range of weight gain.)     · You have increased swelling in your legs, ankles, or feet.     · You are suddenly so tired or weak that you cannot do your usual activities.    Watch closely for changes in your health, and be sure to contact your doctor if you develop new symptoms. Where can you learn more? Go to http://dinesh-cecil.info/. Enter P484 in the search box to learn more about \"Heart Failure: Care Instructions. \"  Current as of: May 10, 2017  Content Version: 11.7  © 0016-9250 [employer]. Care instructions adapted under license by [employer] (which disclaims liability or warranty for this information). If you have questions about a medical condition or this instruction, always ask your healthcare professional. [place] [address] any warranty or liability for your use of this information.     Patient armband removed and shredded

## 2018-08-20 NOTE — MANAGEMENT PLAN
Discharge/Transition Planning    1055: Made patient a new patient appointment with SYLVAIN Mcpherson at 1130 am on 8/24/18. Gave pt pcp appt  1300: Ritu Trevino from [address] [place] [address] pt with getting [place] [employer]. Pt given Homeless/Food Bank and Assistance programs packet. 1450: [facility]. Set [facility] up for pt  Care Management Interventions  PCP Verified by CM: Yes  Palliative Care Criteria Met (RRAT>21 & CHF Dx)?: No  Mode of Transport at Discharge: Other (see comment)  Transition of Care Consult (CM Consult): Discharge Planning  Discharge Durable Medical Equipment: No  Physical Therapy Consult: Yes  Occupational Therapy Consult: Yes  Speech Therapy Consult: No  Current Support Network:  Other  Confirm Follow Up Transport: Other (see comment)  Discharge Location  Discharge Placement: Homeless     Able to go to sister's during daytime      Puja Bhatia RN BSN  Outcomes Manager  Pager # [phone]